# Patient Record
Sex: FEMALE | Race: OTHER | NOT HISPANIC OR LATINO | URBAN - METROPOLITAN AREA
[De-identification: names, ages, dates, MRNs, and addresses within clinical notes are randomized per-mention and may not be internally consistent; named-entity substitution may affect disease eponyms.]

---
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
## 2024-06-18 NOTE — ED PROVIDER NOTE - SKIN
Pt returning call to clinic. Relayed message from provider below. Pt verbalized understanding and will complete antibiotic. Pt does not have any further questions at this time.         Thank you - Yojana Ramos, BSN, RN   No cyanosis, no pallor, no jaundice, no rash

## 2024-09-30 ENCOUNTER — EMERGENCY (EMERGENCY)
Facility: HOSPITAL | Age: 19
LOS: 1 days | Discharge: HOME | End: 2024-09-30
Attending: STUDENT IN AN ORGANIZED HEALTH CARE EDUCATION/TRAINING PROGRAM
Payer: COMMERCIAL

## 2024-09-30 VITALS
DIASTOLIC BLOOD PRESSURE: 76 MMHG | WEIGHT: 213.85 LBS | OXYGEN SATURATION: 98 % | HEIGHT: 68 IN | SYSTOLIC BLOOD PRESSURE: 126 MMHG | HEART RATE: 89 BPM | TEMPERATURE: 98 F | RESPIRATION RATE: 298 BRPM

## 2024-09-30 VITALS
DIASTOLIC BLOOD PRESSURE: 64 MMHG | OXYGEN SATURATION: 96 % | SYSTOLIC BLOOD PRESSURE: 96 MMHG | RESPIRATION RATE: 17 BRPM | TEMPERATURE: 98 F | HEART RATE: 61 BPM

## 2024-09-30 LAB
ANION GAP SERPL CALC-SCNC: 12 MMOL/L — SIGNIFICANT CHANGE UP (ref 5–17)
BASOPHILS # BLD AUTO: 0.05 K/UL — SIGNIFICANT CHANGE UP (ref 0–0.2)
BASOPHILS NFR BLD AUTO: 0.7 % — SIGNIFICANT CHANGE UP (ref 0–2)
BUN SERPL-MCNC: 9.6 MG/DL — SIGNIFICANT CHANGE UP (ref 8–20)
CALCIUM SERPL-MCNC: 9.2 MG/DL — SIGNIFICANT CHANGE UP (ref 8.4–10.5)
CHLORIDE SERPL-SCNC: 104 MMOL/L — SIGNIFICANT CHANGE UP (ref 96–108)
CO2 SERPL-SCNC: 26 MMOL/L — SIGNIFICANT CHANGE UP (ref 22–29)
CREAT SERPL-MCNC: 0.51 MG/DL — SIGNIFICANT CHANGE UP (ref 0.5–1.3)
EGFR: 138 ML/MIN/1.73M2 — SIGNIFICANT CHANGE UP
EOSINOPHIL # BLD AUTO: 0.04 K/UL — SIGNIFICANT CHANGE UP (ref 0–0.5)
EOSINOPHIL NFR BLD AUTO: 0.6 % — SIGNIFICANT CHANGE UP (ref 0–6)
GLUCOSE SERPL-MCNC: 99 MG/DL — SIGNIFICANT CHANGE UP (ref 70–99)
HCG SERPL-ACNC: <4 MIU/ML — SIGNIFICANT CHANGE UP
HCT VFR BLD CALC: 38.7 % — SIGNIFICANT CHANGE UP (ref 34.5–45)
HGB BLD-MCNC: 13 G/DL — SIGNIFICANT CHANGE UP (ref 11.5–15.5)
IMM GRANULOCYTES NFR BLD AUTO: 0.3 % — SIGNIFICANT CHANGE UP (ref 0–0.9)
LYMPHOCYTES # BLD AUTO: 1.91 K/UL — SIGNIFICANT CHANGE UP (ref 1–3.3)
LYMPHOCYTES # BLD AUTO: 27 % — SIGNIFICANT CHANGE UP (ref 13–44)
MCHC RBC-ENTMCNC: 28.7 PG — SIGNIFICANT CHANGE UP (ref 27–34)
MCHC RBC-ENTMCNC: 33.6 GM/DL — SIGNIFICANT CHANGE UP (ref 32–36)
MCV RBC AUTO: 85.4 FL — SIGNIFICANT CHANGE UP (ref 80–100)
MONOCYTES # BLD AUTO: 0.52 K/UL — SIGNIFICANT CHANGE UP (ref 0–0.9)
MONOCYTES NFR BLD AUTO: 7.3 % — SIGNIFICANT CHANGE UP (ref 2–14)
NEUTROPHILS # BLD AUTO: 4.54 K/UL — SIGNIFICANT CHANGE UP (ref 1.8–7.4)
NEUTROPHILS NFR BLD AUTO: 64.1 % — SIGNIFICANT CHANGE UP (ref 43–77)
PLATELET # BLD AUTO: 300 K/UL — SIGNIFICANT CHANGE UP (ref 150–400)
POTASSIUM SERPL-MCNC: 4.5 MMOL/L — SIGNIFICANT CHANGE UP (ref 3.5–5.3)
POTASSIUM SERPL-SCNC: 4.5 MMOL/L — SIGNIFICANT CHANGE UP (ref 3.5–5.3)
RBC # BLD: 4.53 M/UL — SIGNIFICANT CHANGE UP (ref 3.8–5.2)
RBC # FLD: 12.8 % — SIGNIFICANT CHANGE UP (ref 10.3–14.5)
SODIUM SERPL-SCNC: 141 MMOL/L — SIGNIFICANT CHANGE UP (ref 135–145)
WBC # BLD: 7.08 K/UL — SIGNIFICANT CHANGE UP (ref 3.8–10.5)
WBC # FLD AUTO: 7.08 K/UL — SIGNIFICANT CHANGE UP (ref 3.8–10.5)

## 2024-09-30 PROCEDURE — 84702 CHORIONIC GONADOTROPIN TEST: CPT

## 2024-09-30 PROCEDURE — 85025 COMPLETE CBC W/AUTO DIFF WBC: CPT

## 2024-09-30 PROCEDURE — 80048 BASIC METABOLIC PNL TOTAL CA: CPT

## 2024-09-30 PROCEDURE — 93005 ELECTROCARDIOGRAM TRACING: CPT

## 2024-09-30 PROCEDURE — 99283 EMERGENCY DEPT VISIT LOW MDM: CPT | Mod: 25

## 2024-09-30 PROCEDURE — 93010 ELECTROCARDIOGRAM REPORT: CPT

## 2024-09-30 PROCEDURE — 36415 COLL VENOUS BLD VENIPUNCTURE: CPT

## 2024-09-30 PROCEDURE — 99284 EMERGENCY DEPT VISIT MOD MDM: CPT

## 2024-09-30 NOTE — ED PROVIDER NOTE - OBJECTIVE STATEMENT
18-year-old female with PMH of iron deficiency presents with lightheadedness.  Patient states for the past few days feeling lightheaded generalized tired.  Patient denies bleeding from anywhere.  Patient states her LMP was about 2 weeks ago.   Patient reports being told she had low iron a few years ago but never followed up and has not been taking supplements. Pt denies fevers/chills, ha, loc, focal neuro deficits, cp/sob/palp, cough, abd pain/n/v/d, urinary symptoms, recent travel and sick contacts.

## 2024-09-30 NOTE — ED PROVIDER NOTE - ADDITIONAL NOTES AND INSTRUCTIONS:
PT was evaluated At [facility] ED and was found to have a condition that warranted time of to rest and heal from WORK/SCHOOL.   Ronnie Martínez PA-C

## 2024-09-30 NOTE — ED ADULT TRIAGE NOTE - CHIEF COMPLAINT QUOTE
Pt arrives to ED c/o feeling dizzy / lightheaded , " three years ago they told me my iron level was low"

## 2024-09-30 NOTE — ED ADULT NURSE NOTE - NSFALLUNIVINTERV_ED_ALL_ED
Bed/Stretcher in lowest position, wheels locked, appropriate side rails in place/Call bell, personal items and telephone in reach/Instruct patient to call for assistance before getting out of bed/chair/stretcher/Non-slip footwear applied when patient is off stretcher/[place] to call system/Physically safe environment - no spills, clutter or unnecessary equipment/Purposeful proactive rounding/Room/bathroom lighting operational, light cord in reach

## 2024-09-30 NOTE — ED PROVIDER NOTE - PHYSICAL EXAMINATION
Const: Awake, alert and oriented. In no acute distress. Well appearing.  HEENT: NC/AT. Moist mucous membranes.  Eyes: No scleral icterus. EOMI.  Neck:. Soft and supple. Full ROM without pain.  Cardiac: Regular rate and regular rhythm. +S1/S2. Peripheral pulses 2+ and symmetric. No LE edema.  Resp: Speaking in full sentences. No evidence of respiratory distress. No wheezes, rales or rhonchi.  Abd: Soft, non-tender, non-distended. Normal bowel sounds in all 4 quadrants. No guarding or rebound.  Back: Spine midline and non-tender. No CVAT.  Skin: No rashes, abrasions or lacerations.  Lymph: No cervical lymphadenopathy.  Neuro: A&Ox3, moving all extremities symmetrically, follows commands, motor ace upper and lower ext 5/5, sensory symm and intact CN 2-12 grossly intact, no ataxia, no nystagmus, no dysmetria, no ddk, symm ace, no pronator drift

## 2024-09-30 NOTE — ED PROVIDER NOTE - CLINICAL SUMMARY MEDICAL DECISION MAKING FREE TEXT BOX
18-year-old female with PMH of iron deficiency presents with lightheadedness.   Will evaluate for anemia and electrolyte abnormalities will do labs, EKG 18-year-old female with PMH of iron deficiency presents with lightheadedness.   Will evaluate for anemia and electrolyte abnormalities will do labs, EKG    Ronnie Martínez PA-C: PT with stable VS, no acute distress, non toxic appearing, tolerating PO in the ED, Pt with no acute findings, Pt neuro vasc intact, Pt to be dc home with supportive care, follow up to PCP, Pt educated about when to return to the ED if needed. PT verbalizes that he understands all instructions and results. Pt infomred that ED is open and availible 24/7 365 days a yr, encouraged to return to the ED if they have any change in condition, or feel the need for revaluation. 18-year-old female with PMH of iron deficiency presents with lightheadedness.   Will evaluate for anemia and electrolyte abnormalities will do labs, EKG  EKG no acute ischemic changes normal sinus rhythm at a rate of 65  QRS 72 QTc 407 pt with no obvious arrhythmia on ekg such as wpw/burgada/avrt/afib/aflutter/svt/heart block/longqt       Ronnie HERNANDESC: PT with stable VS, no acute distress, non toxic appearing, tolerating PO in the ED, Pt with no acute findings, Pt neuro vasc intact, Pt to be dc home with supportive care, follow up to PCP, Pt educated about when to return to the ED if needed. PT verbalizes that he understands all instructions and results. Pt infomred that ED is open and availible 24/7 365 days a yr, encouraged to return to the ED if they have any change in condition, or feel the need for revaluation.

## 2024-09-30 NOTE — ED PROVIDER NOTE - NSFOLLOWUPINSTRUCTIONS_ED_ALL_ED_FT
Patient education: Weakness (The Basics)  Written by the doctors and editors at [facility]  Please read the Disclaimer at the end of this page.    What is weakness?    "Weakness" usually refers to a lack of muscle strength. Sometimes, people have weakness in just 1 part of the body, like an arm or a leg. Sometimes, the weakness extends to multiple parts of the body.    "Generalized" weakness is when you feel weak all over. Sometimes, people feel weak when they are tired or don't have enough energy, but their muscle strength is not affected.    What can cause weakness?    Different things can cause muscle weakness.    Less serious causes might include:    ?Working your muscles too hard    ?Temporary nerve compression, or when your arm or leg "falls asleep"    More serious causes of muscle weakness might include:    ?Diseases that affect muscles or nerves – Examples include muscular dystrophy, Guillain-Conneaut syndrome, myasthenia, amyotrophic lateral sclerosis, and diabetes.    ?Brain or spinal cord problems – Examples include stroke, multiple sclerosis, tumor, and infections.    Causes of generalized weakness are more varied. They can include stress, poor sleep, anemia, and certain medicines or medical conditions.    Will I need tests?    Maybe. Your doctor or nurse will ask about your symptoms and do an exam. They will check your muscle strength to find out if you have:    ?Muscle tenderness    ?Weakness in just 1 part of your body, 1 side of your body, or throughout your whole body    ?The same muscle strength on both sides of your body    ?Weakness in a specific pattern, such as worse after activity but gets better with rest    Your doctor or nurse might also do:    ?Blood or urine tests    ?Nerve conduction studies – These can show whether the nerves are carrying electrical signals the right way. In people with some causes of weakness, signals can be slow or weak.    ?Electromyography ("EMG") – This test can show whether the muscles are responding the right way to electrical signals.    ?MRI scan of your brain or spine – An MRI is an imaging test that creates pictures of the inside of your body.    ?Muscle biopsy – The doctor takes a small sample of the weak muscle. Then, another doctor looks at the sample under a microscope.    How is weakness treated?    Treatment for weakness depends on what is causing it. If the weakness is caused by other more serious conditions or problems, the doctor or nurse will treat that condition. Physical therapy might be helpful in some cases.    Is there anything I can do on my own to feel better?    It might help to:    ?Avoid alcohol and recreational drugs.    ?Try to get regular physical activity. Even gentle forms of exercise, like walking, are good for your health.    ?Try to get at least 8 hours of sleep every night. If you have trouble sleeping, you can do things to improve your sleep habits. For example, you can:    •Avoid drinking alcohol or caffeine in the late afternoon or evening.    •Try to go to bed and wake up at the same time every day.    •Limit your naps during the day, and don't nap for more than 30 minutes at a time.    ?Try to eat a healthy diet that includes plenty of fruits, vegetables, whole grains, and low-fat dairy products. This can help improve your overall health.    ?Try to avoid falls at home. If you were given a cane or walker, follow the instructions for using it.    What follow-up care do I need?    Your doctor or nurse will tell you if you need to make a follow-up appointment. If so, make sure that you know when and where to go.    When should I call the doctor?    Call for emergency help right away (in the [place] and [place], call 9-1-1) if:    ?You have signs of a stroke like:    •Sudden numbness or weakness of the face, arm, or leg, especially on 1 side of the body    •Sudden confusion, or trouble speaking or understanding    •Sudden trouble seeing in 1 or both eyes    •Sudden trouble walking, dizziness, or loss of balance or coordination    •Sudden severe headache with no known cause    ?You have other signs of severe illness, or your weakness becomes severe, such as:    •You have a fever of 102.2°F (39° C) or higher, shaking chills, or sweats.    •You have trouble walking or lifting things, or feel so weak that you cannot get out of bed.    •You develop severe trouble breathing or severe chest discomfort.    •You become unresponsive.    Call for advice if:    ?You feel like your heart is beating very fast or slow.    ?You become dizzy or weak when standing up from a lying or sitting position.    ?Your weakness gets worse, or you develop new problems such as slurred speech or double vision.    ?You have problems eating or sleeping.    ?You are having trouble functioning at work, at home, or in school.

## 2024-09-30 NOTE — ED ADULT NURSE NOTE - OBJECTIVE STATEMENT
PT c/o dizziness since friday. States symptoms are intermittent. At times she feels like she could "drop" Denies headaches, blurred vision, nausea vomiting. Neurologically intact

## 2024-09-30 NOTE — ED PROVIDER NOTE - PATIENT PORTAL LINK FT
You can access the FollowMyHealth Patient Portal offered by [facility] by registering at the following website: [email or web address]. By joining [employer]’s FollowMyHealth portal, you will also be able to view your health information using other applications (apps) compatible with our system.

## 2025-02-26 ENCOUNTER — OFFICE (OUTPATIENT)
Dept: URBAN - METROPOLITAN AREA CLINIC 116 | Facility: CLINIC | Age: 20
Setting detail: OPHTHALMOLOGY
End: 2025-02-26
Payer: COMMERCIAL

## 2025-02-26 DIAGNOSIS — H16.223: ICD-10-CM

## 2025-02-26 PROBLEM — H52.223 ASTIGMATISM, REGULAR; BOTH EYES: Status: ACTIVE | Noted: 2025-02-26

## 2025-02-26 PROCEDURE — 92004 COMPRE OPH EXAM NEW PT 1/>: CPT | Performed by: OPTOMETRIST

## 2025-02-26 ASSESSMENT — REFRACTION_AUTOREFRACTION
OS_SPHERE: +0.75
OS_AXIS: 179
OS_CYLINDER: -0.50
OD_AXIS: 000
OD_CYLINDER: -0.50
OD_SPHERE: +0.50

## 2025-02-26 ASSESSMENT — KERATOMETRY
OD_K2POWER_DIOPTERS: 42.50
OS_K1POWER_DIOPTERS: 41.50
OS_AXISANGLE_DEGREES: 078
OS_K2POWER_DIOPTERS: 42.50
OD_K1POWER_DIOPTERS: 41.25
OD_AXISANGLE_DEGREES: 096

## 2025-02-26 ASSESSMENT — TEAR BREAK UP TIME (TBUT)
OD_TBUT: T
OS_TBUT: T

## 2025-02-26 ASSESSMENT — VISUAL ACUITY
OS_BCVA: 20/30-
OD_BCVA: 20/30

## 2025-02-26 ASSESSMENT — CONFRONTATIONAL VISUAL FIELD TEST (CVF)
OS_FINDINGS: FULL
OD_FINDINGS: FULL

## 2025-02-26 ASSESSMENT — TONOMETRY
OD_IOP_MMHG: 19
OS_IOP_MMHG: 19

## 2025-02-26 ASSESSMENT — REFRACTION_MANIFEST
OS_AXIS: 180
OD_SPHERE: PLANO
OS_SPHERE: PLANO
OD_CYLINDER: -0.50
OS_CYLINDER: -0.50
OS_VA1: 20/20
OD_AXIS: 180
OD_VA1: 20/20-